# Patient Record
Sex: MALE | Race: WHITE | Employment: UNEMPLOYED | URBAN - NONMETROPOLITAN AREA
[De-identification: names, ages, dates, MRNs, and addresses within clinical notes are randomized per-mention and may not be internally consistent; named-entity substitution may affect disease eponyms.]

---

## 2019-07-13 ENCOUNTER — HOSPITAL ENCOUNTER (EMERGENCY)
Age: 39
Discharge: HOME | End: 2019-07-14
Attending: EMERGENCY MEDICINE
Payer: COMMERCIAL

## 2019-07-13 ENCOUNTER — APPOINTMENT (OUTPATIENT)
Dept: GENERAL RADIOLOGY | Age: 39
End: 2019-07-13
Payer: COMMERCIAL

## 2019-07-13 VITALS
SYSTOLIC BLOOD PRESSURE: 123 MMHG | HEART RATE: 97 BPM | DIASTOLIC BLOOD PRESSURE: 62 MMHG | OXYGEN SATURATION: 98 % | RESPIRATION RATE: 16 BRPM | BODY MASS INDEX: 32.14 KG/M2 | WEIGHT: 200 LBS | TEMPERATURE: 98.2 F | HEIGHT: 66 IN

## 2019-07-13 DIAGNOSIS — S43.004A DISLOCATION OF RIGHT SHOULDER JOINT, INITIAL ENCOUNTER: Primary | ICD-10-CM

## 2019-07-13 DIAGNOSIS — F10.920 ACUTE ALCOHOLIC INTOXICATION WITHOUT COMPLICATION (HCC): ICD-10-CM

## 2019-07-13 LAB — ETHYL ALCOHOL, SERUM: 0.2 %

## 2019-07-13 PROCEDURE — 2700000000 HC OXYGEN THERAPY PER DAY

## 2019-07-13 PROCEDURE — 96375 TX/PRO/DX INJ NEW DRUG ADDON: CPT

## 2019-07-13 PROCEDURE — 96374 THER/PROPH/DIAG INJ IV PUSH: CPT

## 2019-07-13 PROCEDURE — 2709999900 HC NON-CHARGEABLE SUPPLY

## 2019-07-13 PROCEDURE — 6360000002 HC RX W HCPCS: Performed by: NURSE PRACTITIONER

## 2019-07-13 PROCEDURE — G0480 DRUG TEST DEF 1-7 CLASSES: HCPCS

## 2019-07-13 PROCEDURE — 94761 N-INVAS EAR/PLS OXIMETRY MLT: CPT

## 2019-07-13 PROCEDURE — 36415 COLL VENOUS BLD VENIPUNCTURE: CPT

## 2019-07-13 PROCEDURE — 73030 X-RAY EXAM OF SHOULDER: CPT

## 2019-07-13 PROCEDURE — 99284 EMERGENCY DEPT VISIT MOD MDM: CPT

## 2019-07-13 PROCEDURE — 2500000003 HC RX 250 WO HCPCS: Performed by: NURSE PRACTITIONER

## 2019-07-13 PROCEDURE — 23650 CLTX SHO DSLC W/MNPJ WO ANES: CPT

## 2019-07-13 RX ORDER — FENTANYL CITRATE 50 UG/ML
50 INJECTION, SOLUTION INTRAMUSCULAR; INTRAVENOUS ONCE
Status: COMPLETED | OUTPATIENT
Start: 2019-07-13 | End: 2019-07-13

## 2019-07-13 RX ORDER — PROPOFOL 10 MG/ML
1 INJECTION, EMULSION INTRAVENOUS ONCE
Status: COMPLETED | OUTPATIENT
Start: 2019-07-13 | End: 2019-07-14

## 2019-07-13 RX ORDER — ERGOCALCIFEROL 1.25 MG/1
50000 CAPSULE ORAL WEEKLY
COMMUNITY

## 2019-07-13 RX ORDER — ALPRAZOLAM 0.5 MG/1
0.5 TABLET ORAL NIGHTLY PRN
COMMUNITY

## 2019-07-13 RX ORDER — KETAMINE HCL IN NACL, ISO-OSM 100MG/10ML
90 SYRINGE (ML) INJECTION ONCE
Status: COMPLETED | OUTPATIENT
Start: 2019-07-13 | End: 2019-07-13

## 2019-07-13 RX ORDER — LEVOTHYROXINE SODIUM 0.12 MG/1
125 TABLET ORAL DAILY
COMMUNITY

## 2019-07-13 RX ADMIN — Medication 90 MG: at 22:07

## 2019-07-13 RX ADMIN — PROPOFOL 91 MG: 10 INJECTION, EMULSION INTRAVENOUS at 22:07

## 2019-07-13 RX ADMIN — FENTANYL CITRATE 50 MCG: 50 INJECTION INTRAMUSCULAR; INTRAVENOUS at 20:36

## 2019-07-13 ASSESSMENT — PAIN DESCRIPTION - LOCATION: LOCATION: SHOULDER

## 2019-07-13 ASSESSMENT — PAIN DESCRIPTION - FREQUENCY: FREQUENCY: CONTINUOUS

## 2019-07-13 ASSESSMENT — PAIN SCALES - GENERAL
PAINLEVEL_OUTOF10: 10

## 2019-07-13 ASSESSMENT — PAIN DESCRIPTION - DESCRIPTORS: DESCRIPTORS: SHARP

## 2019-07-13 ASSESSMENT — PAIN DESCRIPTION - PAIN TYPE: TYPE: ACUTE PAIN

## 2019-07-13 ASSESSMENT — PAIN DESCRIPTION - ORIENTATION: ORIENTATION: RIGHT

## 2019-07-14 NOTE — ED NOTES
Patient becoming agitated, pushing staff members. Reoriented to situation, calms easily.      Kevin Ramirez, RN  07/13/19 8688

## 2019-07-14 NOTE — ED PROVIDER NOTES
conscious sedation. He was discharged home in a sling and swath with a follow up to [facility] once he had a sober ride. Strict return precautions and follow up instructions were discussed with the patient with which the patient agrees     Physical assessment findings, diagnostic testing(s) if applicable, and vital signs reviewed with patient/patient representative. Questions answered. Medications asdirected, including OTC medications for supportive care. Education provided on medications. Differential diagnosis(s) discussed with patient/patient representative. Home care/self care instructions reviewed withpatient/patient representative. Patient is to follow-up with family care provider in 2-3 days if no improvement. Patient is to go to the emergency department if symptoms worsen. Patient/patient representative isaware of care plan, questions answered, verbalizes understanding and is in agreement. ED Medications administered this visit:   Medications   fentaNYL (SUBLIMAZE) injection 50 mcg (50 mcg Intravenous Given 7/13/19 2036)   propofol injection 91 mg (0 mg/kg × 90.7 kg Intravenous Stopped 7/14/19 0137)   ketamine (KETALAR) injection 90 mg (90 mg Intravenous Given 7/13/19 2207)           I have given the patient strict written and verbal instructions about care at home,follow-up, and signs and symptoms of worsening of condition and they did verbalize understanding. Patient was seen independently by myself. The Patient's final impression and disposition and plan was determined by myself. CRITICAL CARE:   none    CONSULTS:  None    PROCEDURES:  Ortho Injury  Date/Time: 7/14/2019 12:23 AM  Performed by: Chela Robison MD  Authorized by: FREEDOM Morgan CNP   Consent: Verbal consent obtained.   Consent given by: patient  Patient understanding: patient states understanding of the procedure being performed  Patient consent: the patient's understanding of the procedure matches consent